# Patient Record
Sex: FEMALE | Race: OTHER
[De-identification: names, ages, dates, MRNs, and addresses within clinical notes are randomized per-mention and may not be internally consistent; named-entity substitution may affect disease eponyms.]

---

## 2018-07-25 ENCOUNTER — HOSPITAL ENCOUNTER (OUTPATIENT)
Age: 23
End: 2018-07-25
Payer: COMMERCIAL

## 2018-07-25 DIAGNOSIS — Z12.4: Primary | ICD-10-CM

## 2018-07-25 PROCEDURE — 88175 CYTOPATH C/V AUTO FLUID REDO: CPT

## 2018-07-25 PROCEDURE — G0145 SCR C/V CYTO,THINLAYER,RESCR: HCPCS

## 2022-11-07 ENCOUNTER — HOSPITAL ENCOUNTER (OUTPATIENT)
Age: 27
Discharge: HOME | End: 2022-11-07
Payer: MEDICAID

## 2022-11-07 DIAGNOSIS — Z12.4: Primary | ICD-10-CM

## 2022-11-07 PROCEDURE — 88175 CYTOPATH C/V AUTO FLUID REDO: CPT

## 2022-11-07 PROCEDURE — G0145 SCR C/V CYTO,THINLAYER,RESCR: HCPCS

## 2023-10-19 DIAGNOSIS — J45.990 EXERCISE-INDUCED ASTHMA (HHS-HCC): Primary | ICD-10-CM

## 2023-10-19 RX ORDER — LEVALBUTEROL TARTRATE 45 UG/1
AEROSOL, METERED ORAL
Qty: 15 G | Refills: 0 | Status: SHIPPED | OUTPATIENT
Start: 2023-10-19

## 2024-02-22 ENCOUNTER — APPOINTMENT (OUTPATIENT)
Dept: PULMONOLOGY | Facility: CLINIC | Age: 29
End: 2024-02-22
Payer: COMMERCIAL

## 2024-02-29 ENCOUNTER — OFFICE VISIT (OUTPATIENT)
Dept: PULMONOLOGY | Facility: CLINIC | Age: 29
End: 2024-02-29
Payer: COMMERCIAL

## 2024-02-29 VITALS
WEIGHT: 185.8 LBS | HEIGHT: 63 IN | TEMPERATURE: 98.9 F | HEART RATE: 84 BPM | BODY MASS INDEX: 32.92 KG/M2 | DIASTOLIC BLOOD PRESSURE: 84 MMHG | OXYGEN SATURATION: 97 % | SYSTOLIC BLOOD PRESSURE: 115 MMHG

## 2024-02-29 DIAGNOSIS — J45.990 EXERCISE-INDUCED ASTHMA (HHS-HCC): Primary | ICD-10-CM

## 2024-02-29 PROCEDURE — 1036F TOBACCO NON-USER: CPT | Performed by: INTERNAL MEDICINE

## 2024-02-29 PROCEDURE — 99213 OFFICE O/P EST LOW 20 MIN: CPT | Performed by: INTERNAL MEDICINE

## 2024-02-29 NOTE — PROGRESS NOTES
Subjective   Patient ID: Kinga Daniels is a 28 y.o. female who presents for Follow-up and Asthma.  HPI  Patient was seen today in the office on a follow-up visit for her exercise-induced asthma.  Patient reports that her breathing has been good.  She reports using the Xopenex inhaler only about 1 time per month.  The Qvar inhaler she is not using.  She denies any problems with cough or sputum production no hemoptysis and only rare wheezing.  She is rinsing her mouth after use of the inhalers.  Review of Systems  Denies having any fever, chills, rhinitis or sore throat.  She has had no ER visits or urgent care visits since she was last seen in June 2023.  Objective   Physical Exam  Pulmonary, lungs are clear to auscultation bilaterally.  Cardio, heart sounds were regular rate and rhythm.  Extremities, no pretibial edema, cyanosis or clubbing.  Psych, the patient is alert and oriented x 3.  She is not in any respiratory distress today in the office.  Assessment/Plan        Impressions:  1.  Exercise-induced asthma.  Recommendations:  1.  Continue with the Xopenex inhaler 2-day as needed shortness of breath.  2.  Contact my office if she has any new problems with her breathing.  3.  Follow-up with the patient in 1 year.      This note was transcribed using the Dragon Dictation system.  There may be grammatical, punctuation, or verbiage errors that occur with voice recognition programs.    Anthony Major,  02/29/24 9:22 AM

## 2024-07-08 DIAGNOSIS — J45.990 EXERCISE-INDUCED ASTHMA (HHS-HCC): ICD-10-CM

## 2024-07-30 RX ORDER — LEVALBUTEROL TARTRATE 45 UG/1
AEROSOL, METERED ORAL
Qty: 15 G | Refills: 0 | Status: SHIPPED | OUTPATIENT
Start: 2024-07-30

## 2025-03-03 ENCOUNTER — APPOINTMENT (OUTPATIENT)
Dept: PULMONOLOGY | Facility: CLINIC | Age: 30
End: 2025-03-03
Payer: COMMERCIAL

## 2025-06-11 ENCOUNTER — TELEPHONE (OUTPATIENT)
Dept: URGENT CARE | Facility: CLINIC | Age: 30
End: 2025-06-11

## 2025-06-11 ENCOUNTER — OFFICE VISIT (OUTPATIENT)
Dept: URGENT CARE | Facility: CLINIC | Age: 30
End: 2025-06-11
Payer: COMMERCIAL

## 2025-06-11 ENCOUNTER — HOSPITAL ENCOUNTER (OUTPATIENT)
Dept: RADIOLOGY | Facility: HOSPITAL | Age: 30
Discharge: HOME | End: 2025-06-11
Payer: COMMERCIAL

## 2025-06-11 VITALS
BODY MASS INDEX: 31.58 KG/M2 | SYSTOLIC BLOOD PRESSURE: 134 MMHG | HEART RATE: 91 BPM | WEIGHT: 185 LBS | HEIGHT: 64 IN | DIASTOLIC BLOOD PRESSURE: 87 MMHG | OXYGEN SATURATION: 97 % | TEMPERATURE: 98.5 F

## 2025-06-11 DIAGNOSIS — S99.922A INJURY OF TOE ON LEFT FOOT, INITIAL ENCOUNTER: ICD-10-CM

## 2025-06-11 DIAGNOSIS — S92.505A CLOSED NONDISPLACED FRACTURE OF PHALANX OF LESSER TOE OF LEFT FOOT, UNSPECIFIED PHALANX, INITIAL ENCOUNTER: Primary | ICD-10-CM

## 2025-06-11 PROCEDURE — 73630 X-RAY EXAM OF FOOT: CPT | Mod: LT

## 2025-06-11 PROCEDURE — 73630 X-RAY EXAM OF FOOT: CPT | Mod: LEFT SIDE | Performed by: STUDENT IN AN ORGANIZED HEALTH CARE EDUCATION/TRAINING PROGRAM

## 2025-06-11 PROCEDURE — 99213 OFFICE O/P EST LOW 20 MIN: CPT | Performed by: PHYSICIAN ASSISTANT

## 2025-06-11 RX ORDER — FLUTICASONE FUROATE AND VILANTEROL 100; 25 UG/1; UG/1
POWDER RESPIRATORY (INHALATION)
COMMUNITY

## 2025-06-11 RX ORDER — BECLOMETHASONE DIPROPIONATE HFA 40 UG/1
AEROSOL, METERED RESPIRATORY (INHALATION)
COMMUNITY
Start: 2021-08-04

## 2025-06-11 NOTE — PROGRESS NOTES
"Subjective   Patient ID: Kinga Daniels is a 29 y.o. female who presents for Foot Injury (Left pink toe pain after tripping X today ).  HPI  Patient presents for left fifth toe injury.  History was obtained from the MA as patient simply agreed with my summary which was the toe got bent in the wrong direction sometime today.  No other contributory history was offered.  No other complaints.    Review of Systems    Constitutional:  See HPI     Musculoskeletal: See HPI  Neurologic:  Alert and oriented X4, No numbness, No tingling.    All other systems are negative     Objective     /87   Pulse 91   Temp 36.9 °C (98.5 °F) (Temporal)   Ht 1.613 m (5' 3.5\")   Wt 83.9 kg (185 lb)   SpO2 97%   BMI 32.26 kg/m²     Physical Exam    General:  Alert and oriented, No acute distress.    Eye:  Pupils are equal, round and reactive to light, Normal conjunctiva.    HENT:  Normocephalic,   Neck:  Supple    Respiratory: Respirations are non-labored   Musculoskeletal: Left little toe is ecchymotic and slightly swollen  Integumentary:  Warm, Dry, Intact, No pallor, No rash.    Neurologic:  Alert, Oriented, Normal sensory, Cranial Nerves II-XII are grossly intact  Psychiatric:  Cooperative, Appropriate mood & affect.    Assessment/Plan   Exam is consistent with left little toe injury.  X-ray ordered.  Further recommendations pending those results.  Recommend geoff tape either way.  Ice and rest otherwise.  Patient's clinical presentation is otherwise unremarkable at this time. Patient is discharged with instructions to follow-up with primary care or seek emergency medical attention for worsening symptoms or any new concerns.  Problem List Items Addressed This Visit    None  Visit Diagnoses         Injury of toe on left foot, initial encounter    -  Primary    Relevant Orders    XR foot left 3+ views        X-ray showed avulsion fracture of the middle phalanx.  Recommend geoff tape as discussed.  Referred to orthopedics as " discussed.  Ice and rest.  Patient will be notified of results and plan.    Final diagnoses:   [I04.391Z] Injury of toe on left foot, initial encounter

## 2025-06-13 ENCOUNTER — OFFICE VISIT (OUTPATIENT)
Dept: ORTHOPEDIC SURGERY | Facility: CLINIC | Age: 30
End: 2025-06-13
Payer: COMMERCIAL

## 2025-06-13 DIAGNOSIS — S92.505A CLOSED NONDISPLACED FRACTURE OF PHALANX OF LESSER TOE OF LEFT FOOT, UNSPECIFIED PHALANX, INITIAL ENCOUNTER: ICD-10-CM

## 2025-06-13 PROCEDURE — 99203 OFFICE O/P NEW LOW 30 MIN: CPT | Performed by: SPECIALIST

## 2025-06-13 PROCEDURE — 1036F TOBACCO NON-USER: CPT | Performed by: SPECIALIST

## 2025-06-13 ASSESSMENT — PAIN DESCRIPTION - DESCRIPTORS: DESCRIPTORS: SHARP;DULL

## 2025-06-13 ASSESSMENT — PAIN SCALES - GENERAL: PAINLEVEL_OUTOF10: 3

## 2025-06-13 ASSESSMENT — PAIN - FUNCTIONAL ASSESSMENT: PAIN_FUNCTIONAL_ASSESSMENT: 0-10

## 2025-06-13 NOTE — PROGRESS NOTES
Assessment/Plan   Encounter Diagnoses:  Closed nondisplaced fracture of phalanx of lesser toe of left foot, unspecified phalanx, initial encounter  Closed nondisplaced fracture of lesser toe of left foot  Assessment: Minimally displaced base of the middle phalanx fracture of the left little toe.    Plan:  Hard shoe for ambulation.  Follow-up in 3 to 4 weeks for reevaluation with new x-rays.  Consider geoff taping for pain control.       Subjective    Patient ID: Kinga Daniels is a 29 y.o. female.    Chief Complaint: New Patient Visit of the Left Foot (DOI 6-11-25/X-RAYS 6-13-25/STOOD UP AND TWISTED)     Last Surgery: No surgery found  Last Surgery Date: No surgery found    HPI  29-year-old does not work out of the home.  She sustained an acute injury where she kicked the leg of a chair on 6/11/2025.  She sustained a fracture of the middle phalanx of her little toe.  Minimally displaced.  OBJECTIVE: ORTHO EXAM  Left foot and little toe exam  She has normal-appearing alignment.  Bruising and swelling is noted.  She is neurovascularly intact distally.  Tender to palpation over the PIP joint of the little toe.  X-rays were reviewed she does have a fracture of the base of the middle phalanx of the left little toe.  There is a small displaced avulsion fracture is intra-articular.    IMAGE RESULTS:  XR foot left 3+ views  Narrative: Interpreted By:  Teo West,   STUDY:  XR FOOT LEFT 3+ VIEWS; 6/11/2025 4:15 pm      INDICATION:  Signs/Symptoms:5th toe injury.      COMPARISON:  None.      ACCESSION NUMBER(S):  [id number]      ORDERING CLINICIAN:  STEVEN MENDOZA      FINDINGS:  Acute avulsion/chip fracture medial aspect 5th middle phalanx  extending into the 5th PIP joint with adjacent soft tissue swelling.      Joint spaces are maintained.      Impression: Acute intra-articular chip/avulsion fracture medial aspect 5th middle  phalanx base extending into the PIP joint.      Signed by: Teo West 6/11/2025 4:30  PM  Dictation workstation:   [id number]      ULTRASOUND  None    Procedures     Orders Placed This Encounter    XR foot left 3+ views

## 2025-06-13 NOTE — ASSESSMENT & PLAN NOTE
Assessment: Minimally displaced base of the middle phalanx fracture of the left little toe.    Plan:  Hard shoe for ambulation.  Follow-up in 3 to 4 weeks for reevaluation with new x-rays.  Consider geoff taping for pain control.

## 2025-07-11 ENCOUNTER — HOSPITAL ENCOUNTER (OUTPATIENT)
Dept: RADIOLOGY | Facility: EXTERNAL LOCATION | Age: 30
Discharge: HOME | End: 2025-07-11

## 2025-07-11 ENCOUNTER — APPOINTMENT (OUTPATIENT)
Dept: ORTHOPEDIC SURGERY | Facility: CLINIC | Age: 30
End: 2025-07-11
Payer: COMMERCIAL